# Patient Record
Sex: FEMALE | Race: WHITE | NOT HISPANIC OR LATINO | URBAN - METROPOLITAN AREA
[De-identification: names, ages, dates, MRNs, and addresses within clinical notes are randomized per-mention and may not be internally consistent; named-entity substitution may affect disease eponyms.]

---

## 2019-07-05 ENCOUNTER — APPOINTMENT (OUTPATIENT)
Dept: URBAN - METROPOLITAN AREA SURGERY 9 | Age: 44
Setting detail: DERMATOLOGY
End: 2019-07-05

## 2019-07-05 DIAGNOSIS — D22 MELANOCYTIC NEVI: ICD-10-CM

## 2019-07-05 DIAGNOSIS — L81.8 OTHER SPECIFIED DISORDERS OF PIGMENTATION: ICD-10-CM

## 2019-07-05 DIAGNOSIS — L81.5 LEUKODERMA, NOT ELSEWHERE CLASSIFIED: ICD-10-CM

## 2019-07-05 PROBLEM — L81.9 DISORDER OF PIGMENTATION, UNSPECIFIED: Status: ACTIVE | Noted: 2019-07-05

## 2019-07-05 PROBLEM — D48.5 NEOPLASM OF UNCERTAIN BEHAVIOR OF SKIN: Status: ACTIVE | Noted: 2019-07-05

## 2019-07-05 PROBLEM — D22.5 MELANOCYTIC NEVI OF TRUNK: Status: ACTIVE | Noted: 2019-07-05

## 2019-07-05 PROCEDURE — OTHER OBSERVATION: OTHER

## 2019-07-05 PROCEDURE — OTHER COUNSELING: OTHER

## 2019-07-05 PROCEDURE — 99213 OFFICE O/P EST LOW 20 MIN: CPT

## 2019-07-05 ASSESSMENT — LOCATION SIMPLE DESCRIPTION DERM
LOCATION SIMPLE: RIGHT LIP
LOCATION SIMPLE: LEFT PRETIBIAL REGION
LOCATION SIMPLE: RIGHT PRETIBIAL REGION
LOCATION SIMPLE: LEFT LOWER BACK
LOCATION SIMPLE: RIGHT FOREARM

## 2019-07-05 ASSESSMENT — LOCATION DETAILED DESCRIPTION DERM
LOCATION DETAILED: LEFT SUPERIOR MEDIAL MIDBACK
LOCATION DETAILED: RIGHT PROXIMAL DORSAL FOREARM
LOCATION DETAILED: RIGHT UPPER CUTANEOUS LIP
LOCATION DETAILED: LEFT PROXIMAL PRETIBIAL REGION
LOCATION DETAILED: RIGHT PROXIMAL PRETIBIAL REGION

## 2019-07-05 ASSESSMENT — LOCATION ZONE DERM
LOCATION ZONE: ARM
LOCATION ZONE: LIP
LOCATION ZONE: LEG
LOCATION ZONE: TRUNK

## 2019-08-08 ENCOUNTER — APPOINTMENT (OUTPATIENT)
Dept: URBAN - METROPOLITAN AREA SURGERY 9 | Age: 44
Setting detail: DERMATOLOGY
End: 2019-08-08

## 2019-08-08 DIAGNOSIS — L72.0 EPIDERMAL CYST: ICD-10-CM

## 2019-08-08 PROBLEM — D48.5 NEOPLASM OF UNCERTAIN BEHAVIOR OF SKIN: Status: ACTIVE | Noted: 2019-08-08

## 2019-08-08 PROCEDURE — 99213 OFFICE O/P EST LOW 20 MIN: CPT

## 2019-08-08 PROCEDURE — OTHER OTHER: OTHER

## 2019-08-08 PROCEDURE — OTHER OBSERVATION: OTHER

## 2019-08-08 ASSESSMENT — LOCATION ZONE DERM: LOCATION ZONE: LIP

## 2019-08-08 ASSESSMENT — LOCATION SIMPLE DESCRIPTION DERM: LOCATION SIMPLE: RIGHT LIP

## 2019-08-08 ASSESSMENT — LOCATION DETAILED DESCRIPTION DERM: LOCATION DETAILED: RIGHT UPPER CUTANEOUS LIP

## 2019-08-08 NOTE — PROCEDURE: OTHER
Detail Level: Simple
Other (Free Text): Reassured patient that clinically the lesion looks benign, but it does have depth and can’t determine what lesion is for sure without removal (no punctum on exam). Discussed pros and cons of removal, patient aware a scar will result. \\nFor removal, patient can schedule with Mohs surgeons for an excision. She is very worried about the lesion.
Note Text (......Xxx Chief Complaint.): This diagnosis correlates with the

## 2019-08-26 ENCOUNTER — APPOINTMENT (OUTPATIENT)
Dept: URBAN - METROPOLITAN AREA SURGERY 9 | Age: 44
Setting detail: DERMATOLOGY
End: 2019-08-26

## 2019-08-26 DIAGNOSIS — L72.0 EPIDERMAL CYST: ICD-10-CM

## 2019-08-26 PROBLEM — D48.5 NEOPLASM OF UNCERTAIN BEHAVIOR OF SKIN: Status: ACTIVE | Noted: 2019-08-26

## 2019-08-26 PROCEDURE — OTHER CONSULTATION EXCISION: OTHER

## 2019-08-26 PROCEDURE — OTHER EXCISION: OTHER

## 2019-08-26 PROCEDURE — OTHER OTHER: OTHER

## 2019-08-26 PROCEDURE — 12051 INTMD RPR FACE/MM 2.5 CM/<: CPT

## 2019-08-26 PROCEDURE — 11442 EXC FACE-MM B9+MARG 1.1-2 CM: CPT

## 2019-08-26 ASSESSMENT — LOCATION DETAILED DESCRIPTION DERM: LOCATION DETAILED: RIGHT UPPER CUTANEOUS LIP

## 2019-08-26 ASSESSMENT — LOCATION SIMPLE DESCRIPTION DERM: LOCATION SIMPLE: RIGHT LIP

## 2019-08-26 ASSESSMENT — LOCATION ZONE DERM: LOCATION ZONE: LIP

## 2019-08-26 NOTE — PROCEDURE: OTHER
Other (Free Text): Pt declines injection and prefers excisional removal with pathology.  Pt has had close friend go through breast cancer and skin cancer and pt would like histologic confirmation that this lesion is not concerning for a cancer.
Detail Level: Detailed
Note Text (......Xxx Chief Complaint.): This diagnosis correlates with the
Other (Free Text): resulting scar following surgery discussed at length with patient.  Activity restrictions x 1 week and soft foods, minimizing movement of area emphasized

## 2019-09-03 ENCOUNTER — APPOINTMENT (OUTPATIENT)
Dept: URBAN - METROPOLITAN AREA SURGERY 9 | Age: 44
Setting detail: DERMATOLOGY
End: 2019-09-03

## 2019-09-03 DIAGNOSIS — Z48.02 ENCOUNTER FOR REMOVAL OF SUTURES: ICD-10-CM

## 2019-09-03 PROCEDURE — OTHER SUTURE REMOVAL (GLOBAL PERIOD): OTHER

## 2019-09-03 PROCEDURE — 99024 POSTOP FOLLOW-UP VISIT: CPT

## 2019-09-03 ASSESSMENT — LOCATION SIMPLE DESCRIPTION DERM: LOCATION SIMPLE: RIGHT LIP

## 2019-09-03 ASSESSMENT — LOCATION DETAILED DESCRIPTION DERM: LOCATION DETAILED: RIGHT UPPER CUTANEOUS LIP

## 2019-09-03 ASSESSMENT — LOCATION ZONE DERM: LOCATION ZONE: LIP

## 2019-09-03 NOTE — PROCEDURE: SUTURE REMOVAL (GLOBAL PERIOD)
Detail Level: Zone
Add 61115 Cpt? (Important Note: In 2017 The Use Of 15083 Is Being Tracked By Cms To Determine Future Global Period Reimbursement For Global Periods): yes

## 2019-11-17 NOTE — PROCEDURE: EXCISION
Post-Care Instructions: I reviewed with the patient in detail post-care instructions. Patient is not to engage in any heavy lifting, exercise, or swimming for the next 14 days. Should the patient develop any fevers, chills, bleeding, severe pain patient will contact the office immediately. No indicators present

## 2020-12-21 NOTE — PROCEDURE: EXCISION
Is This A New Presentation, Or A Follow-Up?: Skin Lesion What Type Of Note Output Would You Prefer (Optional)?: Standard Output Has Your Skin Lesion Been Treated?: been treated Which Family Member (Optional)?: Mother Additional Anesthesia Volume In Cc: 6

## 2022-01-21 ENCOUNTER — APPOINTMENT (OUTPATIENT)
Dept: URBAN - METROPOLITAN AREA SURGERY 9 | Age: 47
Setting detail: DERMATOLOGY
End: 2022-01-21

## 2022-01-21 DIAGNOSIS — L30.8 OTHER SPECIFIED DERMATITIS: ICD-10-CM

## 2022-01-21 PROCEDURE — OTHER PRESCRIPTION: OTHER

## 2022-01-21 PROCEDURE — OTHER PRESCRIPTION MEDICATION MANAGEMENT: OTHER

## 2022-01-21 PROCEDURE — OTHER OTHER: OTHER

## 2022-01-21 PROCEDURE — OTHER COUNSELING: OTHER

## 2022-01-21 PROCEDURE — 99214 OFFICE O/P EST MOD 30 MIN: CPT

## 2022-01-21 RX ORDER — DESONIDE 0.5 MG/G
CREAM TOPICAL
Qty: 60 | Refills: 5 | Status: ERX | COMMUNITY
Start: 2022-01-21

## 2022-01-21 ASSESSMENT — LOCATION ZONE DERM: LOCATION ZONE: HAND

## 2022-01-21 ASSESSMENT — LOCATION SIMPLE DESCRIPTION DERM: LOCATION SIMPLE: LEFT HAND

## 2022-01-21 ASSESSMENT — LOCATION DETAILED DESCRIPTION DERM: LOCATION DETAILED: LEFT ULNAR PALM

## 2022-01-21 NOTE — PROCEDURE: OTHER
Other (Free Text): Recommended high potency topical steroid, patient declines. Requested desonide cream as she states this has worked well for her in the past.
Note Text (......Xxx Chief Complaint.): This diagnosis correlates with the
Render Risk Assessment In Note?: no
Detail Level: Simple

## 2022-01-21 NOTE — PROCEDURE: PRESCRIPTION MEDICATION MANAGEMENT
Plan: TAC if desonide not covered
Render In Strict Bullet Format?: No
Detail Level: Simple
Initiate Treatment: desonide 0.05 % topical cream twice daily

## 2022-01-21 NOTE — HPI: ECZEMA (PATIENT REPORTED)
Where Is Your Eczema Located?: Left and right hands
List Prescription Topical Steroids You Are Currently Using (Separate Each Name With A Comma):: Desonide cream, desoximetasone ointment
Additional Comments (Use Complete Sentences): Patient states she has been using desonide cream and desoximetasone ointment that she received from pcp several years ago.

## 2022-01-24 ENCOUNTER — RX ONLY (RX ONLY)
Age: 47
End: 2022-01-24

## 2022-01-24 RX ORDER — HYDROCORTISONE 25 MG/G
CREAM TOPICAL
Qty: 28 | Refills: 2 | Status: ERX | COMMUNITY
Start: 2022-01-24

## 2024-02-02 NOTE — PROCEDURE: EXCISION
[FreeTextEntry1] : I, Nely Busch, acted as a scribe on behalf of Dr. Rosa 02/02/2024.   All medical record entries made by the Scribe were at my, Dr.Kip Rosa, direction and personally dictated by me on 02/02/2024. I have reviewed the chart and agree that the record accurately reflects my personal performance of the history, physical exam, assessment and plan. I have also personally directed, reviewed, and agreed with the chart. Hatchet Flap Text: The defect edges were debeveled with a #15 scalpel blade.  Given the location of the defect, shape of the defect and the proximity to free margins a hatchet flap was deemed most appropriate.  Using a sterile surgical marker, an appropriate hatchet flap was drawn incorporating the defect and placing the expected incisions within the relaxed skin tension lines where possible.    The area thus outlined was incised deep to adipose tissue with a #15 scalpel blade.  The skin margins were undermined to an appropriate distance in all directions utilizing iris scissors.